# Patient Record
Sex: FEMALE | Race: WHITE | NOT HISPANIC OR LATINO | URBAN - NONMETROPOLITAN AREA
[De-identification: names, ages, dates, MRNs, and addresses within clinical notes are randomized per-mention and may not be internally consistent; named-entity substitution may affect disease eponyms.]

---

## 2023-06-13 ENCOUNTER — TRANSCRIBE ORDERS (OUTPATIENT)
Dept: ADMINISTRATIVE | Facility: HOSPITAL | Age: 43
End: 2023-06-13
Payer: COMMERCIAL

## 2023-06-13 DIAGNOSIS — E05.90 HYPERTHYROIDISM: Primary | ICD-10-CM

## 2024-10-11 ENCOUNTER — OFFICE VISIT (OUTPATIENT)
Age: 44
End: 2024-10-11
Payer: COMMERCIAL

## 2024-10-11 VITALS
HEART RATE: 81 BPM | SYSTOLIC BLOOD PRESSURE: 126 MMHG | OXYGEN SATURATION: 97 % | BODY MASS INDEX: 38.99 KG/M2 | DIASTOLIC BLOOD PRESSURE: 80 MMHG | HEIGHT: 65 IN | WEIGHT: 234 LBS

## 2024-10-11 DIAGNOSIS — E04.0 SIMPLE GOITER: Primary | ICD-10-CM

## 2024-10-11 PROCEDURE — 86376 MICROSOMAL ANTIBODY EACH: CPT | Performed by: INTERNAL MEDICINE

## 2024-10-11 PROCEDURE — 84445 ASSAY OF TSI GLOBULIN: CPT | Performed by: INTERNAL MEDICINE

## 2024-10-11 RX ORDER — ALBUTEROL SULFATE 90 UG/1
2 AEROSOL, METERED RESPIRATORY (INHALATION) EVERY 4 HOURS PRN
COMMUNITY
Start: 2024-10-01

## 2024-10-11 RX ORDER — LEVOTHYROXINE SODIUM 50 UG/1
50 TABLET ORAL DAILY
COMMUNITY
Start: 2024-10-02

## 2024-10-11 RX ORDER — IBUPROFEN 800 MG/1
800 TABLET, FILM COATED ORAL EVERY 6 HOURS PRN
COMMUNITY
Start: 2024-09-26

## 2024-10-11 RX ORDER — TOPIRAMATE 100 MG/1
1 TABLET, FILM COATED ORAL EVERY 12 HOURS SCHEDULED
COMMUNITY
Start: 2024-07-23

## 2024-10-11 RX ORDER — OMEPRAZOLE 40 MG/1
40 CAPSULE, DELAYED RELEASE ORAL DAILY
COMMUNITY
Start: 2024-07-23

## 2024-10-11 RX ORDER — LORATADINE 10 MG/1
1 TABLET ORAL DAILY
COMMUNITY
Start: 2024-09-26

## 2024-10-11 NOTE — PROGRESS NOTES
"     Office Note      Date: 10/11/2024  Patient Name: Kristal Watkins  MRN: [id number]  : 1980    Chief Complaint   Patient presents with    Other thyrotoxicosis without thyrotoxic crisis or storm       History of Present Illness:   Kristal Watkins is a 44 y.o. female who presents for Other thyrotoxicosis without thyrotoxic crisis or storm  .   Patient is seen for a new patient evaluation  In late  she preseted with an overactive thyroid and was treated with methimazole 15 mg per day. In 2024, her tsh was1  but no dose change was mad. Then in early October her tsh was >100. The methimazole was stopped and levothyroxine was started.   [place] was [place]  No hi iodine intake  No radiation exposure  Mom had thyroid disease   Subjective      Review of Systems:   Review of Systems   Constitutional:  Positive for fatigue and unexpected weight change.   HENT:  Positive for trouble swallowing.    Gastrointestinal:  Positive for constipation.   Endocrine: Positive for cold intolerance.   Psychiatric/Behavioral:  Positive for agitation.        The following portions of the patient's history were reviewed and updated as appropriate: allergies, current medications, past family history, past medical history, past social history, past surgical history, and problem list.    Objective     Visit Vitals  /80 (BP Location: Left arm, Patient Position: Sitting, Cuff Size: Adult)   Pulse 81   Ht 165.1 cm (65\")   Wt 106 kg (234 lb)   SpO2 97%   BMI 38.94 kg/m²           Physical Exam:  Physical Exam  Vitals reviewed.   HENT:      Head: Normocephalic and atraumatic.   Eyes:      Extraocular Movements: Extraocular movements intact.   Neck:      Comments: Huge, smooth soft goiter  Cardiovascular:      Rate and Rhythm: Normal rate.   Pulmonary:      Effort: Pulmonary effort is normal.   Lymphadenopathy:      Cervical: No cervical adenopathy.   Skin:     General: Skin is warm.   Neurological:      General: No focal " Pt wants to know if [facility] can send a rx for Diflucan instead of her using Monistat. Pt would like it sent to the pharmacy on file. Pt would like to be called once it is sent to the pharmacy and that a message can be left on the phone. Sent to [facility]. deficit present.      Mental Status: She is alert.   Psychiatric:         Mood and Affect: Mood normal.         Thought Content: Thought content normal.         Judgment: Judgment normal.         Assessment / Plan      Assessment & Plan:  Problem List Items Addressed This Visit       Simple goiter - Primary    Overview     In late 2023 she preseted with an overactive thyroid and was treated with methimazole 15 mg per day. In July of 2024, her tsh was1  but no dose change was mad. Then in early October her tsh was >100. The methimazole was stopped and levothyroxine was started.   [place] was [place]  No hi iodine intake  No radiation exposure  Mom had thyroid disease          Current Assessment & Plan     She has a huge goiter.  The underlying issue is hyperthyroidism and it looks like she became hypothyroid due to methimazole. She question is whether she actually needs levothyroxine or whether she just needed to stop taking methimazole. I cannot know that answer yet but checking serologies will help anser that question.         Relevant Medications    levothyroxine (SYNTHROID, LEVOTHROID) 50 MCG tablet    Other Relevant Orders    Thyroid Stimulating Immunoglobulin    Thyroid Peroxidase Antibody        Electronically signed by  : Con La MD   10/11/2024

## 2024-10-11 NOTE — ASSESSMENT & PLAN NOTE
She has a huge goiter.  The underlying issue is hyperthyroidism and it looks like she became hypothyroid due to methimazole. She question is whether she actually needs levothyroxine or whether she just needed to stop taking methimazole. I cannot know that answer yet but checking serologies will help anser that question.

## 2024-10-12 LAB — THYROPEROXIDASE AB SERPL-ACNC: 15 IU/ML (ref 0–34)

## 2024-10-15 LAB — TSI SER-ACNC: 0.29 IU/L (ref 0–0.55)

## 2024-11-14 ENCOUNTER — OFFICE VISIT (OUTPATIENT)
Dept: ENDOCRINOLOGY | Facility: CLINIC | Age: 44
End: 2024-11-14
Payer: COMMERCIAL

## 2024-11-14 VITALS
DIASTOLIC BLOOD PRESSURE: 70 MMHG | BODY MASS INDEX: 38.47 KG/M2 | HEART RATE: 91 BPM | WEIGHT: 231.2 LBS | SYSTOLIC BLOOD PRESSURE: 115 MMHG | OXYGEN SATURATION: 99 %

## 2024-11-14 DIAGNOSIS — E03.9 ACQUIRED HYPOTHYROIDISM: Primary | ICD-10-CM

## 2024-11-14 DIAGNOSIS — E04.0 SIMPLE GOITER: ICD-10-CM

## 2024-11-14 PROCEDURE — 84439 ASSAY OF FREE THYROXINE: CPT

## 2024-11-14 PROCEDURE — 84443 ASSAY THYROID STIM HORMONE: CPT

## 2024-11-14 NOTE — PROGRESS NOTES
HPI   Kristal Watkins is a 44 y.o. female who presents to the clinic today for follow-up of thyroiditis. She was previously on methimazole for hyperthyroidism. She developed goiter and labs were rechecked, which showed she was severely hypothyroid. Methimazole was stopped and she was placed on levothyroxine therapy. TSI and TPO antibodies were negative. She feels better since this time and reports her goiter is much improved. She is currently on levothyroxine 50 mcg daily. She complains of irregular and heavy menstrual cycles. She sees gynecology for this as well. She denies any compressive symptoms and reports her neck has decreased significantly in size since her previous visit with Dr. aL.     The following portions of the patient's history were reviewed and updated as appropriate: allergies, current medications, past family history, past medical history, past social history, past surgical history, and problem list.    /70 (BP Location: Right arm, Patient Position: Sitting, Cuff Size: Adult)   Pulse 91   Wt 105 kg (231 lb 3.2 oz)   LMP 2024 (Exact Date)   SpO2 99%   BMI 38.47 kg/m²      Past Medical History:   Diagnosis Date    Anxiety     Asthma     Disc disease, degenerative, cervical     Family history of folic acid deficiency (non-anemic)     Hyperthyroidism     Osteoarthritis      Past Surgical History:   Procedure Laterality Date     SECTION      GALLBLADDER SURGERY      WISDOM TOOTH EXTRACTION        Family History   Family history unknown: Yes      Social History     Socioeconomic History    Marital status:    Tobacco Use    Smoking status: Every Day     Types: Cigarettes     Passive exposure: Current    Smokeless tobacco: Never   Vaping Use    Vaping status: Some Days    Substances: Nicotine, Flavoring    Devices: Disposable, Pre-filled or refillable cartridge, Refillable tank, Pre-filled pod    Passive vaping exposure: Yes   Substance and Sexual Activity    Alcohol use:  Not Currently    Drug use: Never    Sexual activity: Defer      No Known Allergies   Current Outpatient Medications on File Prior to Visit   Medication Sig Dispense Refill    ibuprofen (ADVIL,MOTRIN) 800 MG tablet Take 1 tablet by mouth Every 6 (Six) Hours As Needed.      levothyroxine (SYNTHROID, LEVOTHROID) 50 MCG tablet Take 1 tablet by mouth Daily.      loratadine (CLARITIN) 10 MG tablet Take 1 tablet by mouth Daily.      omeprazole (priLOSEC) 40 MG capsule Take 1 capsule by mouth Daily.      topiramate (TOPAMAX) 100 MG tablet Take 1 tablet by mouth Every 12 (Twelve) Hours.      Ventolin  (90 Base) MCG/ACT inhaler Inhale 2 puffs Every 4 (Four) Hours As Needed.       No current facility-administered medications on file prior to visit.     Review of Systems   Constitutional:  Positive for fatigue and unexpected weight gain. Negative for unexpected weight loss.   HENT:  Negative for trouble swallowing.    Respiratory:  Negative for choking.    Cardiovascular:  Negative for palpitations.   Gastrointestinal:  Negative for abdominal pain, constipation and diarrhea.   Endocrine: Positive for heat intolerance. Negative for cold intolerance.   Genitourinary:  Positive for vaginal bleeding.   Neurological:  Negative for tremors.     Physical Exam  Vitals reviewed.   Constitutional:       Appearance: Normal appearance.   Eyes:      Extraocular Movements: Extraocular movements intact.   Cardiovascular:      Rate and Rhythm: Normal rate.   Pulmonary:      Effort: Pulmonary effort is normal.   Skin:     General: Skin is warm.   Neurological:      General: No focal deficit present.      Mental Status: She is alert and oriented to person, place, and time.   Psychiatric:         Mood and Affect: Mood normal.         Behavior: Behavior normal.         Thought Content: Thought content normal.         Judgment: Judgment normal.       Labs/Imaging  Lab Results   Component Value Date    THYROIDAB 15 10/11/2024     [facility]  "10/11/24:   0.29    No results found for: \"TSH\"     Assessment and Plan  Diagnoses and all orders for this visit:    1. Acquired hypothyroidism (Primary)  Assessment & Plan:  -Appears clinically hypothyroid.  -TFT's today with medication adjustment accordingly.  -We have discussed in detail the nature of the thyroid disease, thyroid hormone action, optimal TSH goals of 0.5-3.5 (goal of 0.5-2.5 in women of child bearing age, or women who want to be come pregnant), method of administration of levothyroxine and medication interactions.  I recommended taking the medication on an empty stomach in the morning or at bedtime, at least 30 minutes prior to intake of food or hot drinks and 4 hours apart from calcium or iron supplements. If a dose is missed patient can take two the following day.      Orders:  -     TSH  -     T4, Free    2. Simple goiter  Assessment & Plan:  -Goiter has decreased significantly in size. She denies any compressive symptoms.   -Will continue to monitor at follow up.        Return in about 6 months (around 5/14/2025). The patient was instructed to contact the clinic with any interval questions or concerns.    Please note that portions of this document were completed with a voice recognition program. Efforts were made to edit the dictations, but occasionally words are mis-transcribed.  This document has been electronically signed by MARKEL Lino  November 14, 2024 21:50 EST   "

## 2024-11-15 LAB
T4 FREE SERPL-MCNC: 1.58 NG/DL (ref 0.92–1.68)
TSH SERPL DL<=0.05 MIU/L-ACNC: 1.46 UIU/ML (ref 0.27–4.2)

## 2024-11-15 NOTE — ASSESSMENT & PLAN NOTE
-Appears clinically hypothyroid.  -TFT's today with medication adjustment accordingly.  -We have discussed in detail the nature of the thyroid disease, thyroid hormone action, optimal TSH goals of 0.5-3.5 (goal of 0.5-2.5 in women of child bearing age, or women who want to be come pregnant), method of administration of levothyroxine and medication interactions.  I recommended taking the medication on an empty stomach in the morning or at bedtime, at least 30 minutes prior to intake of food or hot drinks and 4 hours apart from calcium or iron supplements. If a dose is missed patient can take two the following day.

## 2024-11-15 NOTE — ASSESSMENT & PLAN NOTE
-Goiter has decreased significantly in size. She denies any compressive symptoms.   -Will continue to monitor at follow up.

## 2025-04-04 PROBLEM — D50.9 IRON DEFICIENCY ANEMIA, UNSPECIFIED: Status: ACTIVE | Noted: 2025-04-04

## 2025-04-04 PROBLEM — K90.9 IRON MALABSORPTION: Status: ACTIVE | Noted: 2025-04-04

## 2025-04-07 ENCOUNTER — INFUSION (OUTPATIENT)
Dept: ONCOLOGY | Facility: HOSPITAL | Age: 45
End: 2025-04-07
Payer: COMMERCIAL

## 2025-04-07 VITALS
OXYGEN SATURATION: 100 % | HEART RATE: 58 BPM | BODY MASS INDEX: 39.22 KG/M2 | TEMPERATURE: 98 F | SYSTOLIC BLOOD PRESSURE: 112 MMHG | DIASTOLIC BLOOD PRESSURE: 77 MMHG | WEIGHT: 235.7 LBS | RESPIRATION RATE: 18 BRPM

## 2025-04-07 DIAGNOSIS — D50.9 IRON DEFICIENCY ANEMIA, UNSPECIFIED IRON DEFICIENCY ANEMIA TYPE: ICD-10-CM

## 2025-04-07 DIAGNOSIS — K90.9 IRON MALABSORPTION: Primary | ICD-10-CM

## 2025-04-07 PROCEDURE — 96365 THER/PROPH/DIAG IV INF INIT: CPT

## 2025-04-07 PROCEDURE — 96366 THER/PROPH/DIAG IV INF ADDON: CPT

## 2025-04-07 PROCEDURE — 25010000002 IRON SUCROSE PER 1 MG: Performed by: NURSE PRACTITIONER

## 2025-04-07 RX ADMIN — IRON SUCROSE 300 MG: 20 INJECTION, SOLUTION INTRAVENOUS at 14:30

## 2025-04-14 ENCOUNTER — INFUSION (OUTPATIENT)
Dept: ONCOLOGY | Facility: HOSPITAL | Age: 45
End: 2025-04-14
Payer: COMMERCIAL

## 2025-04-14 VITALS
SYSTOLIC BLOOD PRESSURE: 106 MMHG | HEART RATE: 73 BPM | WEIGHT: 229.9 LBS | DIASTOLIC BLOOD PRESSURE: 70 MMHG | BODY MASS INDEX: 38.26 KG/M2 | OXYGEN SATURATION: 98 % | RESPIRATION RATE: 18 BRPM | TEMPERATURE: 98.3 F

## 2025-04-14 DIAGNOSIS — K90.9 IRON MALABSORPTION: Primary | ICD-10-CM

## 2025-04-14 DIAGNOSIS — D50.9 IRON DEFICIENCY ANEMIA, UNSPECIFIED IRON DEFICIENCY ANEMIA TYPE: ICD-10-CM

## 2025-04-14 PROCEDURE — 25010000002 IRON SUCROSE PER 1 MG: Performed by: NURSE PRACTITIONER

## 2025-04-14 PROCEDURE — 96365 THER/PROPH/DIAG IV INF INIT: CPT

## 2025-04-14 RX ADMIN — IRON SUCROSE 300 MG: 20 INJECTION, SOLUTION INTRAVENOUS at 11:35
